# Patient Record
Sex: FEMALE | Race: WHITE | NOT HISPANIC OR LATINO | URBAN - METROPOLITAN AREA
[De-identification: names, ages, dates, MRNs, and addresses within clinical notes are randomized per-mention and may not be internally consistent; named-entity substitution may affect disease eponyms.]

---

## 2017-04-18 ENCOUNTER — IMPORTED ENCOUNTER (OUTPATIENT)
Dept: URBAN - METROPOLITAN AREA CLINIC 31 | Facility: CLINIC | Age: 72
End: 2017-04-18

## 2017-04-18 PROBLEM — H17.11: Noted: 2017-04-18

## 2017-04-18 PROBLEM — H25.811: Noted: 2017-04-18

## 2017-04-18 PROBLEM — H40.041: Noted: 2017-04-18

## 2017-04-18 PROBLEM — B02.33: Noted: 2017-04-18

## 2017-04-18 PROCEDURE — 92083 EXTENDED VISUAL FIELD XM: CPT

## 2017-04-18 PROCEDURE — 99214 OFFICE O/P EST MOD 30 MIN: CPT

## 2017-04-18 NOTE — PATIENT DISCUSSION
1.  Steroid Induced Glaucoma OD:  Discussed that intraocular pressure is elevated due to steroid medication. VF worse option of 2nd drop vs SLT discussed add latanoprost qhsReturn for an appointment in 6 months for comprehensive exam. VF OCT Nerve. with Dr. Darlene Pierson. 2.  Combined Types of Cataract OD: Explained how cataracts can effect vision. Recommend clinical observation. The patient was advised to contact us if any change or worsening of vision. 3. Central Corneal Scar OD - 4.   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

## 2017-10-31 ENCOUNTER — IMPORTED ENCOUNTER (OUTPATIENT)
Dept: URBAN - METROPOLITAN AREA CLINIC 31 | Facility: CLINIC | Age: 72
End: 2017-10-31

## 2017-10-31 PROBLEM — H40.041: Noted: 2017-10-31

## 2017-10-31 PROBLEM — H25.811: Noted: 2017-10-31

## 2017-10-31 PROBLEM — H17.11: Noted: 2017-10-31

## 2017-10-31 PROBLEM — B02.33: Noted: 2017-10-31

## 2017-10-31 PROCEDURE — 92133 CPTRZD OPH DX IMG PST SGM ON: CPT

## 2017-10-31 PROCEDURE — 92014 COMPRE OPH EXAM EST PT 1/>: CPT

## 2017-10-31 PROCEDURE — 92083 EXTENDED VISUAL FIELD XM: CPT

## 2017-10-31 NOTE — PATIENT DISCUSSION
Steroid Induced Glaucoma OD:  Discussed that intraocular pressure is elevated due to steroid medication. Continue present management. IOP excellent on drops VF worse OCT shows thinning ?need to change meds ? IOP higher at certain times. Return for an appointment in 2 months for pressure check. VF 24-2. OD only with Dr. Jayde Brody.

## 2017-10-31 NOTE — PATIENT DISCUSSION
Combined Types of Cataract OD: Explained how cataracts can effect vision. Recommend clinical observation. The patient was advised to contact us if any change or worsening of vision.

## 2017-10-31 NOTE — PATIENT DISCUSSION
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

## 2018-01-03 ENCOUNTER — IMPORTED ENCOUNTER (OUTPATIENT)
Dept: URBAN - METROPOLITAN AREA CLINIC 31 | Facility: CLINIC | Age: 73
End: 2018-01-03

## 2018-01-03 PROBLEM — H40.041: Noted: 2018-01-03

## 2018-01-03 PROBLEM — H25.811: Noted: 2018-01-03

## 2018-01-03 PROBLEM — H17.11: Noted: 2018-01-03

## 2018-01-03 PROBLEM — B02.33: Noted: 2018-01-03

## 2018-01-03 PROCEDURE — 92083 EXTENDED VISUAL FIELD XM: CPT

## 2018-01-03 PROCEDURE — 99213 OFFICE O/P EST LOW 20 MIN: CPT

## 2018-01-03 NOTE — PATIENT DISCUSSION
1.  Steroid Induced Glaucoma OD:  Discussed that intraocular pressure is elevated due to steroid medication. VF improved pt bothered by redness from latanoprost trial DC. If IOP goes too high may need LIANA or new Latanoprostene. 2. Combined Types of Cataract OD: Explained how cataracts can effect vision. Recommend clinical observation. The patient was advised to contact us if any change or worsening of vision. 3. Central Corneal Scar OD - stable4. Zoster keratitis: Stable CPMReturn for an appointment in 6 weeks for pressure check. with Dr. Barbar Castleman.

## 2018-02-20 ENCOUNTER — IMPORTED ENCOUNTER (OUTPATIENT)
Dept: URBAN - METROPOLITAN AREA CLINIC 31 | Facility: CLINIC | Age: 73
End: 2018-02-20

## 2018-02-20 PROBLEM — H17.11: Noted: 2018-02-20

## 2018-02-20 PROBLEM — H40.041: Noted: 2018-02-20

## 2018-02-20 PROBLEM — H25.811: Noted: 2018-02-20

## 2018-02-20 PROBLEM — B02.33: Noted: 2018-02-20

## 2018-02-20 PROCEDURE — 99214 OFFICE O/P EST MOD 30 MIN: CPT

## 2018-02-20 NOTE — PATIENT DISCUSSION
1.  Steroid Induced Glaucoma OD:  IOP up a little but ok off latanoprost CPM2. Combined Types of Cataract OD: Explained how cataracts can effect vision. Recommend clinical observation. The patient was advised to contact us if any change or worsening of vision. 3. Central Corneal Scar OD - stable4. Zoster keratitis: Stable CPMReturn for an appointment in 2 months for pressure check. OCT Nerve. with Dr. José Miguel Jaramillo.

## 2018-04-17 ENCOUNTER — IMPORTED ENCOUNTER (OUTPATIENT)
Dept: URBAN - METROPOLITAN AREA CLINIC 31 | Facility: CLINIC | Age: 73
End: 2018-04-17

## 2018-04-17 PROBLEM — H25.811: Noted: 2018-04-17

## 2018-04-17 PROBLEM — H17.11: Noted: 2018-04-17

## 2018-04-17 PROBLEM — H40.041: Noted: 2018-04-17

## 2018-04-17 PROBLEM — B02.33: Noted: 2018-04-17

## 2018-04-17 PROCEDURE — 99214 OFFICE O/P EST MOD 30 MIN: CPT

## 2018-04-17 PROCEDURE — 92133 CPTRZD OPH DX IMG PST SGM ON: CPT

## 2018-04-17 NOTE — PATIENT DISCUSSION
1.  Steroid Induced Glaucoma OD:  IOP ok on combigan alone2. Combined Types of Cataract OD: Explained how cataracts can effect vision. Recommend clinical observation. The patient was advised to contact us if any change or worsening of vision. 3. Central Corneal Scar OD - stable4. Zoster keratitis: Stable CPMReturn for an appointment in 6 months for comprehensive exam. VF 24-2. with Dr. Wilner Lindsey.

## 2018-10-25 ENCOUNTER — IMPORTED ENCOUNTER (OUTPATIENT)
Dept: URBAN - METROPOLITAN AREA CLINIC 31 | Facility: CLINIC | Age: 73
End: 2018-10-25

## 2018-10-25 PROBLEM — H40.041: Noted: 2018-10-25

## 2018-10-25 PROBLEM — H25.13: Noted: 2018-10-25

## 2018-10-25 PROBLEM — H17.11: Noted: 2018-10-25

## 2018-10-25 PROCEDURE — 92014 COMPRE OPH EXAM EST PT 1/>: CPT

## 2018-10-25 PROCEDURE — 92083 EXTENDED VISUAL FIELD XM: CPT

## 2018-10-25 NOTE — PATIENT DISCUSSION
1.  Steroid Induced Glaucoma OD:  Discussed that intraocular pressure is elevated due to steroid medication. Trial off of FML. Continue Combigan OD BID f/u 1 mo ta check. 2. Nuclear Sclerotic Cataract OU: Explained how cataracts can effect vision. Recommend clinical observation. The patient was advised to contact us if any change or worsening of vision. 3. Central Corneal Scar OD - MonitorReturn for an appointment in 1 month for pressure check. with Dr. Therese Chandler.

## 2018-11-09 ENCOUNTER — IMPORTED ENCOUNTER (OUTPATIENT)
Dept: URBAN - METROPOLITAN AREA CLINIC 31 | Facility: CLINIC | Age: 73
End: 2018-11-09

## 2018-11-09 PROBLEM — H17.11: Noted: 2018-11-09

## 2018-11-09 PROBLEM — H25.13: Noted: 2018-11-09

## 2018-11-09 PROBLEM — H10.403: Noted: 2018-11-09

## 2018-11-09 PROCEDURE — 99213 OFFICE O/P EST LOW 20 MIN: CPT

## 2018-11-09 NOTE — PATIENT DISCUSSION
1.  Allergic Conjunctivitis OU -- The condition was  discussed with the patient. Avoidance of allergens and cool compresses were recommended. POSSIBLY DUE TO COMBIGAN. Stay off of [address] [place] [address]. Start Zaditor or Alaway OTC 1 drop 2x/d . 2.  Nuclear Sclerotic Cataract OU: Explained how cataracts can effect vision. Recommend clinical observation. The patient was advised to contact us if any change or worsening of vision. 3. Steroid Induced Glaucoma OD:  Discussed that intraocular pressure is elevated due to steroid medication. Trial off of FML. Continue Combigan OD BID. Consider replacing Combigan if eyes are still uncomfortable4. Central Corneal Scar OD - MonitorReturn for an appointment in 4 months for pressure check. with Dr. Alden Spatz.

## 2018-11-30 ENCOUNTER — IMPORTED ENCOUNTER (OUTPATIENT)
Dept: URBAN - METROPOLITAN AREA CLINIC 31 | Facility: CLINIC | Age: 73
End: 2018-11-30

## 2018-11-30 PROBLEM — H40.041: Noted: 2018-11-30

## 2018-11-30 PROBLEM — B02.33: Noted: 2018-11-30

## 2018-11-30 PROBLEM — H17.11: Noted: 2018-11-30

## 2018-11-30 PROCEDURE — 99213 OFFICE O/P EST LOW 20 MIN: CPT

## 2018-11-30 NOTE — PATIENT DISCUSSION
1.  Zoster keratitis: worse since stopping FML restart 2x/d for a week then qd.2. Central Corneal Scar OD -  stable from zoster3. Steroid Induced Glaucoma OD:  Discussed that intraocular pressure is elevated due to steroid medication. Continue present management. 4.  Return for an appointment in 6 weeks for office call. with Dr. Fredrick Allen.

## 2019-01-22 ENCOUNTER — IMPORTED ENCOUNTER (OUTPATIENT)
Dept: URBAN - METROPOLITAN AREA CLINIC 31 | Facility: CLINIC | Age: 74
End: 2019-01-22

## 2019-01-22 PROBLEM — H11.31: Noted: 2019-01-22

## 2019-01-22 PROBLEM — H25.813: Noted: 2019-01-22

## 2019-01-22 PROBLEM — B02.33: Noted: 2019-01-22

## 2019-01-22 PROBLEM — H17.11: Noted: 2019-01-22

## 2019-01-22 PROBLEM — H40.041: Noted: 2019-01-22

## 2019-01-22 PROCEDURE — 99213 OFFICE O/P EST LOW 20 MIN: CPT

## 2019-01-22 NOTE — PATIENT DISCUSSION
1.  Subconjunctival hemorrhage OD --The condition was discussed with patient and the patient was reassured. The patient was asked to notify his family physician should he have increased bleeding or bruising elsewhere or recurrent subconjunctival hemorrhages. 2. Central Corneal Scar OD - stable3. Zoster keratitis: stable on drops FML qd chronic4. Combined Types of Cataract OU: Explained how cataracts can effect vision. Recommend clinical observation. The patient was advised to contact us if any change or worsening of vision. 5. Steroid Induced Glaucoma OD:  Discussed that intraocular pressure is elevated due to steroid medication. Continue present management. 6.  Return for an appointment in 4 months for office call. with Dr. Laurence Miller.

## 2019-04-24 ENCOUNTER — IMPORTED ENCOUNTER (OUTPATIENT)
Dept: URBAN - METROPOLITAN AREA CLINIC 31 | Facility: CLINIC | Age: 74
End: 2019-04-24

## 2019-04-24 PROBLEM — H25.813: Noted: 2019-04-24

## 2019-04-24 PROBLEM — B02.33: Noted: 2019-04-24

## 2019-04-24 PROBLEM — H17.11: Noted: 2019-04-24

## 2019-04-24 PROBLEM — H40.041: Noted: 2019-04-24

## 2019-04-24 PROCEDURE — 99213 OFFICE O/P EST LOW 20 MIN: CPT

## 2019-04-24 NOTE — PATIENT DISCUSSION
1.  Zoster keratitis: Treatment options reviewed including oral antiviral topical steroids and topical Zirgan2. Central Corneal Scar OD - 3. Steroid Induced Glaucoma OD:  Discussed that intraocular pressure is elevated due to steroid medication. Continue present management. 4.  Combined Types of Cataract OU: Explained how cataracts can effect vision. Recommend clinical observation. The patient was advised to contact us if any change or worsening of vision. 5. Return for an appointment in 6 months for comprehensive exam. VF 24-2. Fundus Photo Disc. with Dr. Fredrick Allen.

## 2019-04-24 NOTE — PATIENT DISCUSSION
Return for an appointment in 6 months for comprehensive exam. VF 24-2. Fundus Photo Disc. with Dr. Jayde Brody.

## 2020-02-21 ENCOUNTER — IMPORTED ENCOUNTER (OUTPATIENT)
Dept: URBAN - METROPOLITAN AREA CLINIC 31 | Facility: CLINIC | Age: 75
End: 2020-02-21

## 2020-02-21 PROBLEM — H25.813: Noted: 2020-02-21

## 2020-02-21 PROBLEM — H40.041: Noted: 2020-02-21

## 2020-02-21 PROBLEM — H18.421: Noted: 2020-02-21

## 2020-02-21 PROBLEM — H43.812: Noted: 2020-02-21

## 2020-02-21 PROBLEM — H17.11: Noted: 2020-02-21

## 2020-02-21 PROBLEM — B02.33: Noted: 2020-02-21

## 2020-02-21 PROCEDURE — 92250 FUNDUS PHOTOGRAPHY W/I&R: CPT

## 2020-02-21 PROCEDURE — 92015 DETERMINE REFRACTIVE STATE: CPT

## 2020-02-21 PROCEDURE — 92014 COMPRE OPH EXAM EST PT 1/>: CPT

## 2020-02-21 PROCEDURE — 92083 EXTENDED VISUAL FIELD XM: CPT

## 2020-02-21 NOTE — PATIENT DISCUSSION
1.  Steroid Induced Glaucoma OD:  Discussed that intraocular pressure is elevated due to steroid medication. Continue with current treatment plan. Discussed importance of compliance. Will continue to monitor for stability or progression. 2. Zoster keratitis: stable on current drops cpm3. Central Corneal Scar OD - stable4. 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 Monitor if not affecting vision. t/c SK with EDTA if worsens5. Combined Types of Cataract OU: Explained how cataracts can effect vision. Recommend clinical observation. The patient was advised to contact us if any change or worsening of vision. 6. PVD OS: Patient was cautioned to call our office immediately if they experience a substantial change in their symptoms such as an increase in floaters persistent flashes loss of visual field (may appear as a shadow or a curtain) or decrease in visual acuity as these may indicate a retinal tear or detachment. If this is a new problem patient will need to return for re-examination  as determined by the [address] Return for an appointment in 6 months for pressure check. OCT Nerve. with Dr. Wilner Lindsey.

## 2021-02-01 ENCOUNTER — IMPORTED ENCOUNTER (OUTPATIENT)
Dept: URBAN - METROPOLITAN AREA CLINIC 31 | Facility: CLINIC | Age: 76
End: 2021-02-01

## 2021-02-01 PROBLEM — B02.33: Noted: 2021-02-01

## 2021-02-01 PROBLEM — H17.11: Noted: 2021-02-01

## 2021-02-01 PROBLEM — H25.813: Noted: 2021-02-01

## 2021-02-01 PROBLEM — H40.041: Noted: 2021-02-01

## 2021-02-01 PROBLEM — H18.421: Noted: 2021-02-01

## 2021-02-01 PROCEDURE — 92133 CPTRZD OPH DX IMG PST SGM ON: CPT

## 2021-02-01 PROCEDURE — 99214 OFFICE O/P EST MOD 30 MIN: CPT

## 2021-02-01 PROCEDURE — 92015 DETERMINE REFRACTIVE STATE: CPT

## 2021-02-01 NOTE — PATIENT DISCUSSION
1.  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 Drop in vision risks and benefits of  SK with EDTA discussed including infection reactivation of inflammation poor surface healing. Schedule SK with EDTA OD topical. PO drops Moxifloxacin 4x/d PRED 3x/d Combigan 2x/d DC FML NO NSAID2. Steroid Induced Glaucoma OD:  Discussed that intraocular pressure is elevated due to steroid medication. Continue with current treatment plan. Discussed importance of compliance. Will continue to monitor for stability or progression. 3. Zoster keratitis: stable on FML qd CPM will need to change to PRED for the SK4. Central Corneal Scar OD - stable5. Combined Types of Cataract OU: Explained how cataracts can effect vision. Recommend clinical observation. The patient was advised to contact us if any change or worsening of vision.

## 2021-02-10 ENCOUNTER — IMPORTED ENCOUNTER (OUTPATIENT)
Dept: URBAN - METROPOLITAN AREA CLINIC 31 | Facility: CLINIC | Age: 76
End: 2021-02-10

## 2021-03-01 ENCOUNTER — IMPORTED ENCOUNTER (OUTPATIENT)
Dept: URBAN - METROPOLITAN AREA CLINIC 31 | Facility: CLINIC | Age: 76
End: 2021-03-01

## 2021-03-01 PROBLEM — Z98.89: Noted: 2021-03-01

## 2021-03-01 PROCEDURE — 99024 POSTOP FOLLOW-UP VISIT: CPT

## 2021-03-01 NOTE — PATIENT DISCUSSION
Post Operative: Doing well po drops as instructed. tears prn Call with any problems. Leave BCL on until surface is healed Moxi 2x/d pred and combigan the sameReturn for an appointment in 2 days for post op exam. with Dr. Jenelle Campuzano

## 2021-03-03 ENCOUNTER — IMPORTED ENCOUNTER (OUTPATIENT)
Dept: URBAN - METROPOLITAN AREA CLINIC 31 | Facility: CLINIC | Age: 76
End: 2021-03-03

## 2021-03-03 PROBLEM — Z98.89: Noted: 2021-03-03

## 2021-03-03 PROCEDURE — 99024 POSTOP FOLLOW-UP VISIT: CPT

## 2021-03-03 NOTE — PATIENT DISCUSSION
Post Operative: Doing well DC BCL and moxi pred 2x/d combigan 2x/d.  po drops as instructed. tears prn Call with any problems. Return for an appointment in 3 weeks for post op refraction. with Dr. Michael Aguirre.

## 2021-03-17 ENCOUNTER — IMPORTED ENCOUNTER (OUTPATIENT)
Dept: URBAN - METROPOLITAN AREA CLINIC 31 | Facility: CLINIC | Age: 76
End: 2021-03-17

## 2021-03-17 PROBLEM — Z98.89: Noted: 2021-03-17

## 2021-03-17 PROCEDURE — 99024 POSTOP FOLLOW-UP VISIT: CPT

## 2021-03-17 NOTE — PATIENT DISCUSSION
Post Operative SK W/EDTA OD Doing well po drops as instructed. tears prn Call with any problems. pred 2X/D FOR  a week then qd tears 4x/d [place] 2x/dReturn for an appointment in 3 weeks for post op exam. MRx. with Dr. Lindy Recinos.

## 2021-04-07 ENCOUNTER — IMPORTED ENCOUNTER (OUTPATIENT)
Dept: URBAN - METROPOLITAN AREA CLINIC 31 | Facility: CLINIC | Age: 76
End: 2021-04-07

## 2021-04-07 PROBLEM — Z98.89: Noted: 2021-04-07

## 2021-04-07 PROCEDURE — 99024 POSTOP FOLLOW-UP VISIT: CPT

## 2021-04-07 NOTE — PATIENT DISCUSSION
Post Operative: Neurotrophic breakdown restart BCL Moxi 2x/d pred qd combigan qd tears q1 [place]. po drops as instructed. Call with any problems. Return for an appointment in 1 month for post op exam. with Dr. Alexandria Shrestha.

## 2021-04-14 ENCOUNTER — IMPORTED ENCOUNTER (OUTPATIENT)
Dept: URBAN - METROPOLITAN AREA CLINIC 31 | Facility: CLINIC | Age: 76
End: 2021-04-14

## 2021-04-14 PROBLEM — Z98.89: Noted: 2021-04-14

## 2021-04-14 PROCEDURE — 99024 POSTOP FOLLOW-UP VISIT: CPT

## 2021-04-14 NOTE — PATIENT DISCUSSION
Post Operative:  Doing well po drops as instructed tears prn. Call with any problems. Replace BCL drops the same except Moxi qdReturn for an appointment in 1 week for post op exam. with Dr. Fredrick Allen.

## 2021-04-21 ENCOUNTER — IMPORTED ENCOUNTER (OUTPATIENT)
Dept: URBAN - METROPOLITAN AREA CLINIC 31 | Facility: CLINIC | Age: 76
End: 2021-04-21

## 2021-04-21 PROBLEM — Z98.89: Noted: 2021-04-21

## 2021-04-21 PROCEDURE — 99024 POSTOP FOLLOW-UP VISIT: CPT

## 2021-04-21 NOTE — PATIENT DISCUSSION
Post Operative SK OD: h/o zoster s/p removal of Band K. Surface improved BCL removed Cont Pred 1x/day Combigan 1x/day and stop Moxifloxacin cont. ATS. Call with any problems. Followup on 2 weeks for post op exam. with Dr. Jayde Brody. Return for an appointment for MRx. with Dr. Jyade Brody.

## 2021-04-26 ENCOUNTER — IMPORTED ENCOUNTER (OUTPATIENT)
Dept: URBAN - METROPOLITAN AREA CLINIC 31 | Facility: CLINIC | Age: 76
End: 2021-04-26

## 2021-04-26 PROBLEM — H18.831: Noted: 2021-04-26

## 2021-04-26 PROBLEM — H25.813: Noted: 2021-04-26

## 2021-04-26 PROCEDURE — 99213 OFFICE O/P EST LOW 20 MIN: CPT

## 2021-04-26 PROCEDURE — 92071 CONTACT LENS FITTING FOR TX: CPT

## 2021-04-26 NOTE — PATIENT DISCUSSION
1.  Recurrent Erosion of Cornea OD -  pain upon opening eye this morning. Besivance instilled with BCL. Resume Moxifloxacin BID. 2.  Combined Types of Cataract OU: Explained how cataracts can effect vision. Recommend clinical observation. The patient was advised to contact us if any change or worsening of vision. 3. Return for an appointment in 2 days with Dr. Isael Almeida.

## 2021-04-30 ENCOUNTER — IMPORTED ENCOUNTER (OUTPATIENT)
Dept: URBAN - METROPOLITAN AREA CLINIC 31 | Facility: CLINIC | Age: 76
End: 2021-04-30

## 2021-04-30 PROBLEM — Z98.89: Noted: 2021-04-30

## 2021-04-30 PROBLEM — H18.831: Noted: 2021-04-30

## 2021-04-30 PROCEDURE — 99024 POSTOP FOLLOW-UP VISIT: CPT

## 2021-04-30 NOTE — PATIENT DISCUSSION
Post Operative: Doing well po drops as instructed. tears prn Call with any problems. LEAVE BCL on for another week then when it is removed would start [facility] for an appointment in 1 week for post op exam. with Dr. Claire Lopes.

## 2021-05-07 ENCOUNTER — IMPORTED ENCOUNTER (OUTPATIENT)
Dept: URBAN - METROPOLITAN AREA CLINIC 31 | Facility: CLINIC | Age: 76
End: 2021-05-07

## 2021-05-07 PROBLEM — Z98.89: Noted: 2021-05-07

## 2021-05-07 PROCEDURE — 99024 POSTOP FOLLOW-UP VISIT: CPT

## 2021-05-07 NOTE — PATIENT DISCUSSION
Post Operative: Doing well erosion healed h/o zoster continue pred qd combigan 2x/d add stephan [address] carmen qhs  tears q2-3 hours. dc moxi Call with any problems. Leave for out [address] in a few weeksReturn for an appointment in 2 weeks for post op exam. MRx. with Dr. Darlene Pierson.

## 2021-05-21 ENCOUNTER — IMPORTED ENCOUNTER (OUTPATIENT)
Dept: URBAN - METROPOLITAN AREA CLINIC 31 | Facility: CLINIC | Age: 76
End: 2021-05-21

## 2021-05-21 PROBLEM — H40.041: Noted: 2021-05-21

## 2021-05-21 PROBLEM — H04.121: Noted: 2021-05-21

## 2021-05-21 PROBLEM — H25.811: Noted: 2021-05-21

## 2021-05-21 PROBLEM — B02.33: Noted: 2021-05-21

## 2021-05-21 PROBLEM — H25.813: Noted: 2021-05-21

## 2021-05-21 PROBLEM — Z98.89: Noted: 2021-05-21

## 2021-05-21 PROCEDURE — 99024 POSTOP FOLLOW-UP VISIT: CPT

## 2021-05-21 NOTE — PATIENT DISCUSSION
1.  Post Operative: s/p SK with EDTA OD small residual band K that could not be removed corneal erosions resolved on Natalya 128 ointment QHS tears 4-6 x's daily Call with any problems. 2. Zoster keratitis: Mild increase in haze increase PRED to 2xd for a month then back to qd. Explained pupil is larger from iris sphincter damage from zoster. 3. Dry Eye OD:  Continue current management with Artificial Tears. t/c serum tears if not better4. Combined Types of Cataract OU: Explained how cataracts can effect vision. Recommend clinical observation. The patient was advised to contact us if any change or worsening of vision. 5. Steroid Induced Glaucoma OD:  Discussed that intraocular pressure is elevated due to steroid medication. Continue with current treatment plan. Discussed importance of compliance. Will continue to monitor for stability or progression. Going to [place] for summer f/u with Dr in 4-6 weeks6. Return for an appointment in 6 months for office call. MRx. with Dr. Steffany Eaton.

## 2022-01-19 ENCOUNTER — IMPORTED ENCOUNTER (OUTPATIENT)
Dept: URBAN - METROPOLITAN AREA CLINIC 31 | Facility: CLINIC | Age: 77
End: 2022-01-19

## 2022-01-19 PROBLEM — H18.831: Noted: 2022-01-19

## 2022-01-19 PROBLEM — H40.041: Noted: 2022-01-19

## 2022-01-19 PROBLEM — H18.421: Noted: 2022-01-19

## 2022-01-19 PROBLEM — H17.11: Noted: 2022-01-19

## 2022-01-19 PROBLEM — B02.33: Noted: 2022-01-19

## 2022-01-19 PROBLEM — H25.813: Noted: 2022-01-19

## 2022-01-19 PROCEDURE — 92015 DETERMINE REFRACTIVE STATE: CPT

## 2022-01-19 PROCEDURE — 99213 OFFICE O/P EST LOW 20 MIN: CPT

## 2022-01-19 NOTE — PATIENT DISCUSSION
Steroid Induced Glaucoma OD:  IOP normal off drops after change to FML. Pt saw glaucoma specialist in [place] get old records.

## 2022-01-19 NOTE — PATIENT DISCUSSION
1. Combined Types of Cataract OU: Explained how cataracts can effect vision. Recommend clinical observation. The patient was advised to contact us if any change or worsening of vision. 2. Zoster keratitis: Quiet continue FML 2x/d3. Central Corneal Scar OD - h/o Zoster quiet4. 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 Monitor if not affecting vision. s/p SK with EDTA stable5. Steroid Induced Glaucoma OD:  IOP normal off drops after change to FML. Pt saw glaucoma specialist in [place] get old records. 6. Return for an appointment in 6 months for comprehensive exam. OCT Nerve. with Dr. Junie Marin.

## 2022-04-02 ASSESSMENT — VISUAL ACUITY
OU_SC: 20/20
OD_SC: CF@6''
OD_SC: 20/200
OS_SC: 20/20-2
OS_SC: 20/20
OS_SC: 20/20-1
OD_SC: 20/25-2
OS_SC: 20/25
OD_SC: 20/80-1
OS_SC: 20/20-1
OD_PH: CC 20/60 -1
OD_SC: 20/30+2
OD_PH: CC 20/60 -1
OS_CC: 20/400
OS_SC: 20/25-2
OD_SC: 20/200
OD_PH: CC 20/40 -2
OD_SC: 20/25-2
OS_SC: 20/20-1
OS_SC: 20/20-2
OD_SC: 20/40
OS_SC: 20/20-2
OD_SC: 20/25-1
OS_SC: 20/20
OS_CC: J1
OD_PH: CC 20/80 -1
OD_SC: 20/200
OD_PH: CC 20/80
OD_CC: 20/400
OD_PH: CC 20/70
OD_PH: CC 20/50
OS_SC: 20/25
OD_PH: CC 20/60 -2
OS_SC: 20/20-2
OD_SC: 20/40-2
OS_SC: 20/25+2
OD_PH: CC 20/100 -1
OD_SC: 20/40
OS_SC: 20/25-3
OS_SC: 20/20
OD_PH: CC 20/40 +2
OU_SC: 20/25
OD_SC: 20/40-2
OS_SC: 20/20-1
OS_SC: 20/25-2
OD_SC: 20/400
OD_SC: 20/60-2
OD_PH: CC 20/40 -2
OD_SC: 20/400
OD_CC: J1
OS_SC: 20/20-2
OD_SC: 20/80-2
OS_SC: 20/20-1
OD_SC: 20/200
OD_SC: 20/60-2
OS_SC: 20/20
OD_SC: 20/400
OD_SC: 20/400

## 2022-04-02 ASSESSMENT — TONOMETRY
OD_IOP_MMHG: 13
OD_IOP_MMHG: 14
OD_IOP_MMHG: 12
OD_IOP_MMHG: 12
OD_IOP_MMHG: 15
OS_IOP_MMHG: 11
OS_IOP_MMHG: 13
OS_IOP_MMHG: 14
OS_IOP_MMHG: 15
OD_IOP_MMHG: 15
OS_IOP_MMHG: 13
OD_IOP_MMHG: 16
OD_IOP_MMHG: 17
OD_IOP_MMHG: 14
OS_IOP_MMHG: 14
OS_IOP_MMHG: 15
OD_IOP_MMHG: 16
OD_IOP_MMHG: 11
OS_IOP_MMHG: 14
OS_IOP_MMHG: 11
OD_IOP_MMHG: 14
OS_IOP_MMHG: 12
OS_IOP_MMHG: 13
OS_IOP_MMHG: 12
OD_IOP_MMHG: 13
OS_IOP_MMHG: 13
OD_IOP_MMHG: 15

## 2023-05-18 NOTE — PATIENT DISCUSSION
Zoster keratitis: Mild increase in haze increase PRED to 2xd for a month then back to qd. Explained pupil is larger from iris sphincter damage from zoster. Cognitive/behavioral therapy, Acceptance and Commitment therapy, Emotion regulation/coping skills taught, Psychoed Cognitive/behavioral therapy, Emotion regulation/coping skills taught, Psychoeducation, Acceptance and Commitment Therapy (ACT)   Cognitive/behavioral therapy, Emotion regulation/coping skills taught, Psychoeducation, Acceptance and Commitment Therapy (ACT)

## 2023-10-13 ENCOUNTER — OFFICE VISIT (OUTPATIENT)
Facility: LOCATION | Age: 78
End: 2023-10-13
Payer: MEDICARE

## 2023-10-13 DIAGNOSIS — H40.1112 PRIMARY OPEN-ANGLE GLAUCOMA, RIGHT EYE, MODERATE STAGE: Primary | ICD-10-CM

## 2023-10-13 DIAGNOSIS — H25.11 AGE-RELATED NUCLEAR CATARACT, RIGHT EYE: ICD-10-CM

## 2023-10-13 PROCEDURE — 99214 OFFICE O/P EST MOD 30 MIN: CPT | Performed by: OPHTHALMOLOGY

## 2023-10-13 ASSESSMENT — INTRAOCULAR PRESSURE
OS: 12
OD: 15

## 2024-01-19 ENCOUNTER — OFFICE VISIT (OUTPATIENT)
Facility: LOCATION | Age: 79
End: 2024-01-19
Payer: MEDICARE

## 2024-01-19 DIAGNOSIS — H40.1112 PRIMARY OPEN-ANGLE GLAUCOMA, RIGHT EYE, MODERATE STAGE: Primary | ICD-10-CM

## 2024-03-08 ENCOUNTER — OFFICE VISIT (OUTPATIENT)
Facility: LOCATION | Age: 79
End: 2024-03-08
Payer: MEDICARE

## 2024-03-08 DIAGNOSIS — H40.1112 PRIMARY OPEN-ANGLE GLAUCOMA, RIGHT EYE, MODERATE STAGE: Primary | ICD-10-CM

## 2024-03-08 DIAGNOSIS — H25.11 AGE-RELATED NUCLEAR CATARACT, RIGHT EYE: ICD-10-CM

## 2024-03-08 DIAGNOSIS — H17.9 CORNEAL OPACITY: ICD-10-CM

## 2024-03-08 PROCEDURE — 99214 OFFICE O/P EST MOD 30 MIN: CPT | Performed by: OPHTHALMOLOGY

## 2024-03-08 ASSESSMENT — INTRAOCULAR PRESSURE
OS: 13
OD: 16

## 2024-08-09 ENCOUNTER — OFFICE VISIT (OUTPATIENT)
Facility: LOCATION | Age: 79
End: 2024-08-09
Payer: MEDICARE

## 2024-08-09 DIAGNOSIS — H40.1112 PRIMARY OPEN-ANGLE GLAUCOMA, RIGHT EYE, MODERATE STAGE: Primary | ICD-10-CM

## 2024-08-09 DIAGNOSIS — H17.9 CORNEAL OPACITY: ICD-10-CM

## 2024-08-09 DIAGNOSIS — H25.11 AGE-RELATED NUCLEAR CATARACT, RIGHT EYE: ICD-10-CM

## 2024-08-09 PROCEDURE — 99214 OFFICE O/P EST MOD 30 MIN: CPT | Performed by: OPHTHALMOLOGY

## 2024-08-09 PROCEDURE — 92133 CPTRZD OPH DX IMG PST SGM ON: CPT | Performed by: OPHTHALMOLOGY

## 2024-08-09 RX ORDER — FLUOROMETHOLONE 1 MG/ML
0.1 % SOLUTION/ DROPS OPHTHALMIC
Qty: 5 | Refills: 11 | Status: ACTIVE
Start: 2024-08-09

## 2024-08-09 RX ORDER — ERYTHROMYCIN 5 MG/G
OINTMENT OPHTHALMIC
Qty: 1 | Refills: 11 | Status: ACTIVE
Start: 2024-08-09

## 2024-08-09 ASSESSMENT — INTRAOCULAR PRESSURE
OS: 14
OD: 15

## 2025-01-27 ENCOUNTER — OFFICE VISIT (OUTPATIENT)
Facility: LOCATION | Age: 80
End: 2025-01-27
Payer: MEDICARE

## 2025-01-27 DIAGNOSIS — H17.9 CORNEAL OPACITY: ICD-10-CM

## 2025-01-27 DIAGNOSIS — H40.1112 PRIMARY OPEN-ANGLE GLAUCOMA, RIGHT EYE, MODERATE STAGE: Primary | ICD-10-CM

## 2025-01-27 DIAGNOSIS — H25.13 AGE-RELATED NUCLEAR CATARACT, BILATERAL: ICD-10-CM

## 2025-01-27 PROCEDURE — 99214 OFFICE O/P EST MOD 30 MIN: CPT | Performed by: OPHTHALMOLOGY

## 2025-01-27 RX ORDER — FLUOROMETHOLONE 1 MG/ML
0.1 % SUSPENSION/ DROPS OPHTHALMIC
Qty: 5 | Refills: 1 | Status: ACTIVE
Start: 2025-01-27

## 2025-01-27 RX ORDER — BRIMONIDINE TARTRATE, TIMOLOL MALEATE 2; 5 MG/ML; MG/ML
SOLUTION/ DROPS OPHTHALMIC
Qty: 10 | Refills: 3 | Status: ACTIVE
Start: 2025-01-27

## 2025-01-27 ASSESSMENT — INTRAOCULAR PRESSURE
OS: 11
OD: 10

## 2025-05-27 ENCOUNTER — TECH ONLY (OUTPATIENT)
Facility: LOCATION | Age: 80
End: 2025-05-27
Payer: MEDICARE

## 2025-05-27 DIAGNOSIS — H40.1112 PRIMARY OPEN-ANGLE GLAUCOMA, RIGHT EYE, MODERATE STAGE: Primary | ICD-10-CM

## 2025-06-02 ENCOUNTER — OFFICE VISIT (OUTPATIENT)
Facility: LOCATION | Age: 80
End: 2025-06-02
Payer: MEDICARE

## 2025-06-02 DIAGNOSIS — H40.1112 PRIMARY OPEN-ANGLE GLAUCOMA, RIGHT EYE, MODERATE STAGE: Primary | ICD-10-CM

## 2025-06-02 DIAGNOSIS — H17.9 CORNEAL OPACITY: ICD-10-CM

## 2025-06-02 DIAGNOSIS — H25.13 AGE-RELATED NUCLEAR CATARACT, BILATERAL: ICD-10-CM

## 2025-06-02 PROCEDURE — 99214 OFFICE O/P EST MOD 30 MIN: CPT | Performed by: OPHTHALMOLOGY

## 2025-06-02 RX ORDER — FLUOROMETHOLONE 1 MG/ML
0.1 % SUSPENSION/ DROPS OPHTHALMIC
Qty: 5 | Refills: 11 | Status: ACTIVE
Start: 2025-06-02

## 2025-06-02 RX ORDER — ERYTHROMYCIN 5 MG/G
OINTMENT OPHTHALMIC
Qty: 1 | Refills: 11 | Status: ACTIVE
Start: 2025-06-02

## 2025-06-02 ASSESSMENT — INTRAOCULAR PRESSURE
OS: 13
OD: 14